# Patient Record
Sex: FEMALE | Race: WHITE | NOT HISPANIC OR LATINO
[De-identification: names, ages, dates, MRNs, and addresses within clinical notes are randomized per-mention and may not be internally consistent; named-entity substitution may affect disease eponyms.]

---

## 2017-03-12 ENCOUNTER — TRANSCRIPTION ENCOUNTER (OUTPATIENT)
Age: 22
End: 2017-03-12

## 2019-02-14 ENCOUNTER — TRANSCRIPTION ENCOUNTER (OUTPATIENT)
Age: 24
End: 2019-02-14

## 2019-08-20 PROBLEM — Z00.00 ENCOUNTER FOR PREVENTIVE HEALTH EXAMINATION: Status: ACTIVE | Noted: 2019-08-20

## 2019-09-30 ENCOUNTER — APPOINTMENT (OUTPATIENT)
Dept: OBGYN | Facility: CLINIC | Age: 24
End: 2019-09-30
Payer: COMMERCIAL

## 2019-09-30 VITALS
SYSTOLIC BLOOD PRESSURE: 120 MMHG | WEIGHT: 133.5 LBS | HEIGHT: 62 IN | BODY MASS INDEX: 24.56 KG/M2 | DIASTOLIC BLOOD PRESSURE: 80 MMHG

## 2019-09-30 DIAGNOSIS — Z78.9 OTHER SPECIFIED HEALTH STATUS: ICD-10-CM

## 2019-09-30 DIAGNOSIS — Z80.8 FAMILY HISTORY OF MALIGNANT NEOPLASM OF OTHER ORGANS OR SYSTEMS: ICD-10-CM

## 2019-09-30 PROCEDURE — 99204 OFFICE O/P NEW MOD 45 MIN: CPT

## 2019-09-30 NOTE — PHYSICAL EXAM
[Awake] : awake [Alert] : alert [Mass] : no breast mass [Acute Distress] : no acute distress [Nipple Discharge] : no nipple discharge [Soft] : soft [Axillary LAD] : no axillary lymphadenopathy [Tender] : non tender [Oriented x3] : oriented to person, place, and time [No Bleeding] : there was no active vaginal bleeding [Normal] : uterus [Uterine Adnexae] : were not tender and not enlarged

## 2019-10-02 LAB
C TRACH RRNA SPEC QL NAA+PROBE: NOT DETECTED
N GONORRHOEA RRNA SPEC QL NAA+PROBE: NOT DETECTED
SOURCE TP AMPLIFICATION: NORMAL

## 2019-10-09 LAB — CYTOLOGY CVX/VAG DOC THIN PREP: NORMAL

## 2020-01-31 ENCOUNTER — TRANSCRIPTION ENCOUNTER (OUTPATIENT)
Age: 25
End: 2020-01-31

## 2021-06-09 ENCOUNTER — APPOINTMENT (OUTPATIENT)
Dept: OBGYN | Facility: CLINIC | Age: 26
End: 2021-06-09
Payer: COMMERCIAL

## 2021-06-09 VITALS
BODY MASS INDEX: 24.03 KG/M2 | WEIGHT: 130.56 LBS | HEIGHT: 62 IN | SYSTOLIC BLOOD PRESSURE: 123 MMHG | HEART RATE: 80 BPM | DIASTOLIC BLOOD PRESSURE: 80 MMHG

## 2021-06-09 DIAGNOSIS — N94.6 DYSMENORRHEA, UNSPECIFIED: ICD-10-CM

## 2021-06-09 PROCEDURE — 99072 ADDL SUPL MATRL&STAF TM PHE: CPT

## 2021-06-09 PROCEDURE — 99395 PREV VISIT EST AGE 18-39: CPT

## 2021-06-11 LAB
C TRACH RRNA SPEC QL NAA+PROBE: NOT DETECTED
HPV HIGH+LOW RISK DNA PNL CVX: NOT DETECTED
N GONORRHOEA RRNA SPEC QL NAA+PROBE: NOT DETECTED
SOURCE TP AMPLIFICATION: NORMAL

## 2021-06-14 LAB — CYTOLOGY CVX/VAG DOC THIN PREP: NORMAL

## 2021-12-08 ENCOUNTER — APPOINTMENT (OUTPATIENT)
Dept: OBGYN | Facility: CLINIC | Age: 26
End: 2021-12-08
Payer: COMMERCIAL

## 2021-12-08 VITALS
HEIGHT: 62 IN | DIASTOLIC BLOOD PRESSURE: 82 MMHG | WEIGHT: 130.13 LBS | SYSTOLIC BLOOD PRESSURE: 118 MMHG | HEART RATE: 72 BPM | BODY MASS INDEX: 23.95 KG/M2

## 2021-12-08 DIAGNOSIS — Z30.9 ENCOUNTER FOR CONTRACEPTIVE MANAGEMENT, UNSPECIFIED: ICD-10-CM

## 2021-12-08 PROCEDURE — 99214 OFFICE O/P EST MOD 30 MIN: CPT

## 2021-12-08 NOTE — HISTORY OF PRESENT ILLNESS
[Oral Contraceptive] : uses oral contraception pills [Y] : Positive pregnancy history [Menarche Age: ____] : age at menarche was [unfilled] [PapSmeardate] : 06/21 [PGxTotal] : 1 [[facility]] : 1 [PGHxPremature] : 0 [PGHxAbortions] : 0 [[facility] Hospitaliving] : 1 [PGHxABInduced] : 0 [PGHxABSpont] : 0 [PGHxEctopic] : 0 [PGHxMultBirths] : 0

## 2022-05-12 ENCOUNTER — RX RENEWAL (OUTPATIENT)
Age: 27
End: 2022-05-12

## 2022-11-29 ENCOUNTER — NON-APPOINTMENT (OUTPATIENT)
Age: 27
End: 2022-11-29

## 2022-12-01 ENCOUNTER — APPOINTMENT (OUTPATIENT)
Dept: ORTHOPEDIC SURGERY | Facility: CLINIC | Age: 27
End: 2022-12-01

## 2022-12-01 VITALS
DIASTOLIC BLOOD PRESSURE: 84 MMHG | WEIGHT: 130 LBS | BODY MASS INDEX: 23.92 KG/M2 | HEIGHT: 62 IN | HEART RATE: 66 BPM | SYSTOLIC BLOOD PRESSURE: 124 MMHG

## 2022-12-01 DIAGNOSIS — M79.632 PAIN IN LEFT FOREARM: ICD-10-CM

## 2022-12-01 PROCEDURE — 99202 OFFICE O/P NEW SF 15 MIN: CPT

## 2022-12-01 NOTE — PHYSICAL EXAM
[de-identified] : The patient is conversive and in no apparent distress. The patient is alert and oriented to person, place, and time. Affect and mood appear normal. The head is normocephalic and atraumatic. Skin shows normal turgor with no evidence of eczema or psoriasis. No respiratory distress noted. Sensation grossly intact. MUSCULOSKELETAL:   SEE BELOW\par \par Left forearm, wrist and hand exam demonstrates normal alignment.  Soft tissues are normal.  No swelling.  No masses.  No surgical scars.  No signs of trauma or recent injury.  Normal temperature.  No bony tenderness of the joints of the left upper extremity.  There is tenderness of the mid left dorsal forearm between the ulnar and radius.  No tenderness of the ventral aspect is appreciated.  Normal passive range of motion of all bony joints.  This does not increase pain.  No rigidity.  No pain with resisted wrist extension.  Normal sensation to light touch.  Normal pulses. [de-identified] : Recent x-rays were reviewed.  No lytic lesions.  No fractures.  No retained hardware.  No soft tissue abnormalities appreciated.\par \par \par \par Procedure was performed at the [facility]\par \par EXAM: WRIST LEFT\par \par \par PROCEDURE DATE: 11/30/2022\par \par \par INTERPRETATION: XR WRIST LEFT\par \par HISTORY: Left wrist pain for 2 days with no known trauma.\par \par VIEWS: Frontal, lateral, oblique, and ulnar deviated frontal IMAGES: 4\par \par COMPARISON: None.\par \par FINDINGS:\par \par OSSEOUS STRUCTURES\par  Fractures: None.\par \par JOINTS\par  Joint Space(s): Maintained.\par \par IMPRESSION:\par No acute osseous abnormalities.\par \par --- End of Report ---\par \par \par \par \par \par LINSEY MARCELO MD; Resident Radiologist\par This document has been electronically signed.\par MINNIE NIÑO MD; Attending Radiologist\par This document has been electronically signed. Nov 30 2022 3:50PM\par \par \par \par    \par \par \par \par \par \par \par \par \par \par \par \par \par \par \par \par   \par  \par  \par \par \par Notes

## 2022-12-01 NOTE — DISCUSSION/SUMMARY
[de-identified] : 25 minutes was spent reviewing the x-rays as well as discussing with the patient their clinical presentation, diagnosis and providing education.  The recent x-rays were reviewed.  The patient's primary complaint is pain which she describes as a burning sensation of the left forearm.  It is not associate with any specific neck pain.  Normal motor function was found.  There is no complaints of motor dysfunction.  She does not recall the specific traumatic event causing her pain.  At this time the patient is recommended to continue to follow her symptoms.  If she is not improved in 2 weeks time she is recommended neurology or physiatry follow-up.  The primary complaint appears to be possibly related to nerve origin.  The patient will continue with activities as tolerated.  All questions answered to the patient satisfaction.

## 2022-12-01 NOTE — HISTORY OF PRESENT ILLNESS
[de-identified] : Patient presents today for evaluation of left forearm pain.  She is had the pain since Monday.  She reports the pain is within the mid left forearm.  It is both on the dorsal and ventral aspects.  She denies any specific injuries or trauma.  She denies of any associated motor changes.  She describes the pain as a burning sensation.  She does not have any pain when she moves the arm around.  The pain is not associated with any neck discomfort.  She is not regularly using any anti-inflammatories or analgesics for pain control.  She is not using any bracing or splinting.  She was seen in urgent care recently and referred to orthopedics for evaluation.  She is right-hand dominant.\par \par Review of Systems-\par Constitutional: No fever or chills. \par Cardiovascular: No orthopnea or chest pain\par Pulmonary: No shortness of breath. \par GI: No nausea or vomiting or abdominal pain.\par Musculoskeletal: see HPI \par Psychiatric: No anxiety and depression.

## 2023-02-01 ENCOUNTER — APPOINTMENT (OUTPATIENT)
Dept: OBGYN | Facility: CLINIC | Age: 28
End: 2023-02-01

## 2023-02-07 ENCOUNTER — LABORATORY RESULT (OUTPATIENT)
Age: 28
End: 2023-02-07

## 2023-02-07 ENCOUNTER — APPOINTMENT (OUTPATIENT)
Dept: OBGYN | Facility: CLINIC | Age: 28
End: 2023-02-07
Payer: COMMERCIAL

## 2023-02-07 VITALS
HEIGHT: 66 IN | DIASTOLIC BLOOD PRESSURE: 74 MMHG | BODY MASS INDEX: 21.53 KG/M2 | WEIGHT: 134 LBS | SYSTOLIC BLOOD PRESSURE: 108 MMHG

## 2023-02-07 DIAGNOSIS — Z01.411 ENCOUNTER FOR GYNECOLOGICAL EXAMINATION (GENERAL) (ROUTINE) WITH ABNORMAL FINDINGS: ICD-10-CM

## 2023-02-07 PROCEDURE — 99395 PREV VISIT EST AGE 18-39: CPT

## 2023-02-07 RX ORDER — NORETHINDRONE ACETATE AND ETHINYL ESTRADIOL AND FERROUS FUMARATE 1.5-30(21)
1.5-3 KIT ORAL DAILY
Qty: 84 | Refills: 3 | Status: DISCONTINUED | COMMUNITY
Start: 2021-06-09 | End: 2023-02-07

## 2023-02-07 NOTE — HISTORY OF PRESENT ILLNESS
[Patient reported PAP Smear was normal] : Patient reported PAP Smear was normal [N] : Patient does not use contraception [Y] : Patient is sexually active [Frequency: Q ___ days] : menstrual periods occur approximately every [unfilled] days [Menarche Age: ____] : age at menarche was [unfilled] [FreeTextEntry1] : 27 year old female presents for annual examination. Pt. is sexually active using nothing for contraception. Self discontinued OCP back in July or August. Pt, desires OCP today. No complaints of pelvic pain and vaginal discharge.  [PapSmeardate] : 2021 [PGxTotal] : 1 [[facility] CenterxF[facility]] : 1 [PGHxPremature] : 0 [PGHxAbortions] : 0 [[facility] Hospitaliving] : 1 [PGHxABInduced] : 0 [PGHxABSpont] : 0 [PGHxEctopic] : 0 [PGHxMultBirths] : 0

## 2023-02-07 NOTE — PLAN
[FreeTextEntry1] : Encounter for GYN exam \par \par - PAP obtained \par - STD testing offered and declined \par \par Encounter for counseling on contraception \par \par - Risks and benefits regarding contraception discussed. Pt. desires OCP. Pt. verbalized understanding to risks and benefits. \par \par F/U in 6 months \par All questions and concerns addressed during encounter. Pt. agreed to plan of care.

## 2023-02-09 LAB — HPV HIGH+LOW RISK DNA PNL CVX: DETECTED

## 2023-02-14 LAB — CYTOLOGY CVX/VAG DOC THIN PREP: ABNORMAL

## 2023-03-02 ENCOUNTER — APPOINTMENT (OUTPATIENT)
Dept: OBGYN | Facility: CLINIC | Age: 28
End: 2023-03-02
Payer: COMMERCIAL

## 2023-03-02 ENCOUNTER — RESULT CHARGE (OUTPATIENT)
Age: 28
End: 2023-03-02

## 2023-03-02 VITALS
WEIGHT: 135 LBS | HEIGHT: 66 IN | BODY MASS INDEX: 21.69 KG/M2 | DIASTOLIC BLOOD PRESSURE: 60 MMHG | SYSTOLIC BLOOD PRESSURE: 110 MMHG

## 2023-03-02 LAB
HCG UR QL: NEGATIVE
QUALITY CONTROL: YES

## 2023-03-02 PROCEDURE — 81025 URINE PREGNANCY TEST: CPT

## 2023-03-02 PROCEDURE — 57454 BX/CURETT OF CERVIX W/SCOPE: CPT

## 2023-03-02 NOTE — PROCEDURE
[Colposcopy] : Colposcopy  [Time out performed] : Pre-procedure time out performed.  Patient's name, date of birth and procedure confirmed. [Consent Obtained] : Consent obtained [Risks] : risks [Benefits] : benefits [Alternatives] : alternatives [Patient] : patient [Infection] : infection [Bleeding] : bleeding [Allergic Reaction] : allergic reaction [Abnormal Pap] : abnormal pap [Colposcopy Adequate] : colposcopy adequate [Pap Performed] : pap not performed [SCI Fully Visualized] : SCI fully visualized [ECC Performed] : ECC performed [No Abnormalities] : no abnormalities [Lesion] : lesion seen [Biopsy] : biopsy taken [Hemostasis Obtained] : Hemostasis obtained [Tolerated Well] : the patient tolerated the procedure well [de-identified] : 2 [de-identified] : Acetowhite changes [de-identified] : 9:00 and 12:00 location Punch biopsy [de-identified] : Punch biopsy [de-identified] : 28-year-old G1, P1 with ASCUS positive HPV for colposcopy.  Colposcopy was adequate with mild acetowhite changes at 12:00 location.  Cervical biopsies taken from 12 and 9:00 locations plus endocervical curetting.  Patient tolerated the procedure well.  Return in 4 weeks for follow-up.

## 2023-03-10 LAB — CORE LAB BIOPSY: NORMAL

## 2023-03-31 ENCOUNTER — APPOINTMENT (OUTPATIENT)
Dept: OBGYN | Facility: CLINIC | Age: 28
End: 2023-03-31
Payer: COMMERCIAL

## 2023-03-31 VITALS
HEIGHT: 66 IN | SYSTOLIC BLOOD PRESSURE: 118 MMHG | DIASTOLIC BLOOD PRESSURE: 70 MMHG | BODY MASS INDEX: 21.69 KG/M2 | WEIGHT: 135 LBS

## 2023-03-31 PROCEDURE — 99214 OFFICE O/P EST MOD 30 MIN: CPT

## 2023-09-22 ENCOUNTER — APPOINTMENT (OUTPATIENT)
Dept: OBGYN | Facility: CLINIC | Age: 28
End: 2023-09-22
Payer: COMMERCIAL

## 2023-09-22 ENCOUNTER — NON-APPOINTMENT (OUTPATIENT)
Age: 28
End: 2023-09-22

## 2023-09-22 VITALS
HEIGHT: 66 IN | WEIGHT: 135 LBS | SYSTOLIC BLOOD PRESSURE: 124 MMHG | BODY MASS INDEX: 21.69 KG/M2 | DIASTOLIC BLOOD PRESSURE: 82 MMHG

## 2023-09-22 DIAGNOSIS — Z30.09 ENCOUNTER FOR OTHER GENERAL COUNSELING AND ADVICE ON CONTRACEPTION: ICD-10-CM

## 2023-09-22 PROCEDURE — 99214 OFFICE O/P EST MOD 30 MIN: CPT

## 2023-09-25 LAB — HPV HIGH+LOW RISK DNA PNL CVX: NOT DETECTED

## 2023-09-29 LAB — CYTOLOGY CVX/VAG DOC THIN PREP: ABNORMAL

## 2023-10-27 ENCOUNTER — APPOINTMENT (OUTPATIENT)
Dept: OBGYN | Facility: CLINIC | Age: 28
End: 2023-10-27
Payer: COMMERCIAL

## 2023-10-27 VITALS
SYSTOLIC BLOOD PRESSURE: 132 MMHG | WEIGHT: 135 LBS | HEIGHT: 66 IN | BODY MASS INDEX: 21.69 KG/M2 | DIASTOLIC BLOOD PRESSURE: 85 MMHG

## 2023-10-27 DIAGNOSIS — R87.619 UNSPECIFIED ABNORMAL CYTOLOGICAL FINDINGS IN SPECIMENS FROM CERVIX UTERI: ICD-10-CM

## 2023-10-27 LAB
HCG UR QL: NEGATIVE
QUALITY CONTROL: YES

## 2023-10-27 PROCEDURE — 81025 URINE PREGNANCY TEST: CPT

## 2023-10-27 PROCEDURE — 57454 BX/CURETT OF CERVIX W/SCOPE: CPT

## 2023-11-03 LAB — CORE LAB BIOPSY: NORMAL

## 2023-11-27 ENCOUNTER — APPOINTMENT (OUTPATIENT)
Dept: OBGYN | Facility: CLINIC | Age: 28
End: 2023-11-27
Payer: COMMERCIAL

## 2023-11-27 VITALS
SYSTOLIC BLOOD PRESSURE: 110 MMHG | HEIGHT: 66 IN | DIASTOLIC BLOOD PRESSURE: 66 MMHG | BODY MASS INDEX: 21.69 KG/M2 | WEIGHT: 135 LBS

## 2023-11-27 DIAGNOSIS — Z30.09 ENCOUNTER FOR OTHER GENERAL COUNSELING AND ADVICE ON CONTRACEPTION: ICD-10-CM

## 2023-11-27 PROCEDURE — 99214 OFFICE O/P EST MOD 30 MIN: CPT

## 2023-11-27 RX ORDER — NORETHINDRONE ACETATE AND ETHINYL ESTRADIOL AND FERROUS FUMARATE 1.5-30(21)
1.5-3 KIT ORAL DAILY
Qty: 90 | Refills: 3 | Status: ACTIVE | COMMUNITY
Start: 2023-02-07 | End: 1900-01-01

## 2024-01-20 ENCOUNTER — NON-APPOINTMENT (OUTPATIENT)
Age: 29
End: 2024-01-20

## 2024-01-29 ENCOUNTER — NON-APPOINTMENT (OUTPATIENT)
Age: 29
End: 2024-01-29

## 2024-07-09 ENCOUNTER — NON-APPOINTMENT (OUTPATIENT)
Age: 29
End: 2024-07-09

## 2024-07-10 ENCOUNTER — APPOINTMENT (OUTPATIENT)
Dept: OBGYN | Facility: CLINIC | Age: 29
End: 2024-07-10
Payer: COMMERCIAL

## 2024-07-10 VITALS
BODY MASS INDEX: 22.11 KG/M2 | SYSTOLIC BLOOD PRESSURE: 120 MMHG | HEIGHT: 66 IN | DIASTOLIC BLOOD PRESSURE: 72 MMHG | WEIGHT: 137.6 LBS

## 2024-07-10 DIAGNOSIS — R87.619 UNSPECIFIED ABNORMAL CYTOLOGICAL FINDINGS IN SPECIMENS FROM CERVIX UTERI: ICD-10-CM

## 2024-07-10 DIAGNOSIS — Z30.09 ENCOUNTER FOR OTHER GENERAL COUNSELING AND ADVICE ON CONTRACEPTION: ICD-10-CM

## 2024-07-10 PROCEDURE — 99214 OFFICE O/P EST MOD 30 MIN: CPT

## 2024-07-11 LAB
C TRACH RRNA SPEC QL NAA+PROBE: NOT DETECTED
HPV HIGH+LOW RISK DNA PNL CVX: NOT DETECTED
N GONORRHOEA RRNA SPEC QL NAA+PROBE: NOT DETECTED

## 2024-07-15 LAB — CYTOLOGY CVX/VAG DOC THIN PREP: ABNORMAL

## 2024-07-20 ENCOUNTER — NON-APPOINTMENT (OUTPATIENT)
Age: 29
End: 2024-07-20

## 2024-11-09 ENCOUNTER — NON-APPOINTMENT (OUTPATIENT)
Age: 29
End: 2024-11-09

## 2024-11-11 ENCOUNTER — NON-APPOINTMENT (OUTPATIENT)
Age: 29
End: 2024-11-11

## 2024-11-23 ENCOUNTER — NON-APPOINTMENT (OUTPATIENT)
Age: 29
End: 2024-11-23

## 2025-01-31 ENCOUNTER — APPOINTMENT (OUTPATIENT)
Dept: OBGYN | Facility: CLINIC | Age: 30
End: 2025-01-31
Payer: COMMERCIAL

## 2025-01-31 VITALS
SYSTOLIC BLOOD PRESSURE: 110 MMHG | WEIGHT: 142.19 LBS | BODY MASS INDEX: 22.85 KG/M2 | HEIGHT: 66 IN | DIASTOLIC BLOOD PRESSURE: 72 MMHG

## 2025-01-31 DIAGNOSIS — N91.1 SECONDARY AMENORRHEA: ICD-10-CM

## 2025-01-31 DIAGNOSIS — R11.0 NAUSEA: ICD-10-CM

## 2025-01-31 DIAGNOSIS — Z87.898 PERSONAL HISTORY OF OTHER SPECIFIED CONDITIONS: ICD-10-CM

## 2025-01-31 PROCEDURE — 99214 OFFICE O/P EST MOD 30 MIN: CPT | Mod: 25

## 2025-01-31 PROCEDURE — 99395 PREV VISIT EST AGE 18-39: CPT

## 2025-01-31 PROCEDURE — 99459 PELVIC EXAMINATION: CPT

## 2025-01-31 RX ORDER — DOXYLAMINE SUCCINATE AND PYRIDOXINE HYDROCHLORIDE 10; 10 MG/1; MG/1
10-10 TABLET, DELAYED RELEASE ORAL
Qty: 30 | Refills: 3 | Status: ACTIVE | COMMUNITY
Start: 2025-01-31 | End: 1900-01-01

## 2025-01-31 RX ORDER — PNV NO.95/FERROUS FUM/FOLIC AC 28MG-0.8MG
28-0.8 TABLET ORAL DAILY
Qty: 90 | Refills: 3 | Status: ACTIVE | COMMUNITY
Start: 2025-01-31 | End: 1900-01-01

## 2025-02-03 LAB
C TRACH RRNA SPEC QL NAA+PROBE: NOT DETECTED
N GONORRHOEA RRNA SPEC QL NAA+PROBE: NOT DETECTED
SOURCE AMPLIFICATION: NORMAL

## 2025-02-11 DIAGNOSIS — Z34.91 ENCOUNTER FOR SUPERVISION OF NORMAL PREGNANCY, UNSPECIFIED, FIRST TRIMESTER: ICD-10-CM

## 2025-02-11 DIAGNOSIS — Z13.79 ENCOUNTER FOR OTHER SCREENING FOR GENETIC AND CHROMOSOMAL ANOMALIES: ICD-10-CM

## 2025-02-12 ENCOUNTER — ASOB RESULT (OUTPATIENT)
Age: 30
End: 2025-02-12

## 2025-02-12 ENCOUNTER — APPOINTMENT (OUTPATIENT)
Dept: ANTEPARTUM | Facility: CLINIC | Age: 30
End: 2025-02-12
Payer: COMMERCIAL

## 2025-02-12 PROCEDURE — 76801 OB US < 14 WKS SINGLE FETUS: CPT

## 2025-02-17 ENCOUNTER — LABORATORY RESULT (OUTPATIENT)
Age: 30
End: 2025-02-17

## 2025-02-18 ENCOUNTER — APPOINTMENT (OUTPATIENT)
Dept: OBGYN | Facility: CLINIC | Age: 30
End: 2025-02-18
Payer: COMMERCIAL

## 2025-02-18 VITALS
HEIGHT: 66 IN | DIASTOLIC BLOOD PRESSURE: 80 MMHG | BODY MASS INDEX: 22.7 KG/M2 | WEIGHT: 141.25 LBS | SYSTOLIC BLOOD PRESSURE: 122 MMHG

## 2025-02-18 DIAGNOSIS — Z3A.10 10 WEEKS GESTATION OF PREGNANCY: ICD-10-CM

## 2025-02-18 LAB
APPEARANCE: CLEAR
BILIRUBIN URINE: NEGATIVE
BLOOD URINE: NEGATIVE
COLOR: YELLOW
GLUCOSE QUALITATIVE U: NEGATIVE
KETONES URINE: NEGATIVE
LEUKOCYTE ESTERASE URINE: ABNORMAL
NITRITE URINE: NEGATIVE
PH URINE: 5.5
PROTEIN URINE: NEGATIVE
SPECIFIC GRAVITY URINE: >=1.03
UROBILINOGEN URINE: 0.2 (ref 0.2–?)

## 2025-02-18 PROCEDURE — 0500F INITIAL PRENATAL CARE VISIT: CPT

## 2025-02-19 LAB
ABORH: NORMAL
ALBUMIN SERPL ELPH-MCNC: 3.8 G/DL
ALP BLD-CCNC: 46 U/L
ALT SERPL-CCNC: 19 U/L
ANION GAP SERPL CALC-SCNC: 13 MMOL/L
ANTIBODY SCREEN: NORMAL
APPEARANCE: ABNORMAL
AST SERPL-CCNC: 19 U/L
BILIRUB SERPL-MCNC: <0.2 MG/DL
BILIRUBIN URINE: NEGATIVE
BLOOD URINE: NEGATIVE
BUN SERPL-MCNC: 14 MG/DL
CALCIUM SERPL-MCNC: 8.9 MG/DL
CHLORIDE SERPL-SCNC: 102 MMOL/L
CO2 SERPL-SCNC: 22 MMOL/L
COLOR: YELLOW
CREAT SERPL-MCNC: 0.66 MG/DL
EGFR: 120 ML/MIN/1.73M2
ESTIMATED AVERAGE GLUCOSE: 94 MG/DL
GLUCOSE QUALITATIVE U: NEGATIVE MG/DL
GLUCOSE SERPL-MCNC: 70 MG/DL
HBA1C MFR BLD HPLC: 4.9 %
HBV SURFACE AG SER QL: NONREACTIVE
HCT VFR BLD CALC: 37 %
HCV AB SER QL: NONREACTIVE
HCV S/CO RATIO: 0.14 S/CO
HGB A MFR BLD: 97.3 %
HGB A2 MFR BLD: 2.7 %
HGB BLD-MCNC: 12.6 G/DL
HGB FRACT BLD-IMP: NORMAL
HIV1+2 AB SPEC QL IA.RAPID: NONREACTIVE
KETONES URINE: NEGATIVE MG/DL
LEUKOCYTE ESTERASE URINE: ABNORMAL
MCHC RBC-ENTMCNC: 31.3 PG
MCHC RBC-ENTMCNC: 34.1 G/DL
MCV RBC AUTO: 92 FL
NITRITE URINE: NEGATIVE
PH URINE: 5.5
PLATELET # BLD AUTO: 188 K/UL
POTASSIUM SERPL-SCNC: 4.8 MMOL/L
PROT SERPL-MCNC: 6.1 G/DL
PROTEIN URINE: NEGATIVE MG/DL
RBC # BLD: 4.02 M/UL
RBC # FLD: 12 %
SODIUM SERPL-SCNC: 136 MMOL/L
SPECIFIC GRAVITY URINE: >1.03
T PALLIDUM AB SER QL IA: NEGATIVE
TSH SERPL-ACNC: 4.05 UIU/ML
UROBILINOGEN URINE: 0.2 MG/DL
WBC # FLD AUTO: 8.09 K/UL

## 2025-02-20 LAB
B19V IGG SER QL IA: 5.6 INDEX
B19V IGG+IGM SER-IMP: NORMAL
B19V IGG+IGM SER-IMP: POSITIVE
B19V IGM FLD-ACNC: 0.14 INDEX
B19V IGM SER-ACNC: NEGATIVE
LEAD BLD-MCNC: <1 UG/DL
MEV IGG FLD QL IA: >300 AU/ML
MEV IGG+IGM SER-IMP: POSITIVE
MUV AB SER-ACNC: POSITIVE
MUV IGG SER QL IA: 211 AU/ML
RUBV IGG FLD-ACNC: 3.69 INDEX
RUBV IGG SER-IMP: POSITIVE
VZV AB TITR SER: POSITIVE
VZV IGG SER IF-ACNC: 19.4 S/CO

## 2025-02-22 LAB
AR GENE MUT ANL BLD/T: NORMAL
FMR1 GENE MUT ANL BLD/T: NORMAL
M TB IFN-G BLD-IMP: NEGATIVE
QUANTIFERON TB PLUS MITOGEN MINUS NIL: >10 IU/ML
QUANTIFERON TB PLUS NIL: 0.02 IU/ML
QUANTIFERON TB PLUS TB1 MINUS NIL: 0 IU/ML
QUANTIFERON TB PLUS TB2 MINUS NIL: 0 IU/ML

## 2025-03-04 ENCOUNTER — APPOINTMENT (OUTPATIENT)
Dept: ANTEPARTUM | Facility: CLINIC | Age: 30
End: 2025-03-04
Payer: COMMERCIAL

## 2025-03-04 ENCOUNTER — ASOB RESULT (OUTPATIENT)
Age: 30
End: 2025-03-04

## 2025-03-04 ENCOUNTER — APPOINTMENT (OUTPATIENT)
Dept: OBGYN | Facility: CLINIC | Age: 30
End: 2025-03-04
Payer: COMMERCIAL

## 2025-03-04 ENCOUNTER — NON-APPOINTMENT (OUTPATIENT)
Age: 30
End: 2025-03-04

## 2025-03-04 VITALS
SYSTOLIC BLOOD PRESSURE: 112 MMHG | BODY MASS INDEX: 22.99 KG/M2 | HEIGHT: 66 IN | DIASTOLIC BLOOD PRESSURE: 62 MMHG | WEIGHT: 143.06 LBS

## 2025-03-04 DIAGNOSIS — Z3A.13 13 WEEKS GESTATION OF PREGNANCY: ICD-10-CM

## 2025-03-04 PROCEDURE — 36415 COLL VENOUS BLD VENIPUNCTURE: CPT

## 2025-03-04 PROCEDURE — 76813 OB US NUCHAL MEAS 1 GEST: CPT

## 2025-03-04 PROCEDURE — 0502F SUBSEQUENT PRENATAL CARE: CPT

## 2025-03-13 ENCOUNTER — ASOB RESULT (OUTPATIENT)
Age: 30
End: 2025-03-13

## 2025-03-13 ENCOUNTER — APPOINTMENT (OUTPATIENT)
Dept: MATERNAL FETAL MEDICINE | Facility: CLINIC | Age: 30
End: 2025-03-13
Payer: COMMERCIAL

## 2025-03-13 PROCEDURE — 99202 OFFICE O/P NEW SF 15 MIN: CPT | Mod: 95

## 2025-03-19 ENCOUNTER — NON-APPOINTMENT (OUTPATIENT)
Age: 30
End: 2025-03-19

## 2025-03-19 DIAGNOSIS — Z3A.13 13 WEEKS GESTATION OF PREGNANCY: ICD-10-CM

## 2025-03-19 DIAGNOSIS — Z34.92 ENCOUNTER FOR SUPERVISION OF NORMAL PREGNANCY, UNSPECIFIED, SECOND TRIMESTER: ICD-10-CM

## 2025-03-19 DIAGNOSIS — Z34.91 ENCOUNTER FOR SUPERVISION OF NORMAL PREGNANCY, UNSPECIFIED, FIRST TRIMESTER: ICD-10-CM

## 2025-03-19 DIAGNOSIS — Z3A.10 10 WEEKS GESTATION OF PREGNANCY: ICD-10-CM

## 2025-03-27 ENCOUNTER — NON-APPOINTMENT (OUTPATIENT)
Age: 30
End: 2025-03-27

## 2025-03-27 ENCOUNTER — APPOINTMENT (OUTPATIENT)
Dept: OBGYN | Facility: CLINIC | Age: 30
End: 2025-03-27
Payer: COMMERCIAL

## 2025-03-27 VITALS
HEIGHT: 66 IN | WEIGHT: 145 LBS | BODY MASS INDEX: 23.3 KG/M2 | DIASTOLIC BLOOD PRESSURE: 82 MMHG | SYSTOLIC BLOOD PRESSURE: 122 MMHG

## 2025-03-27 DIAGNOSIS — Z3A.15 15 WEEKS GESTATION OF PREGNANCY: ICD-10-CM

## 2025-03-27 PROCEDURE — 0502F SUBSEQUENT PRENATAL CARE: CPT

## 2025-03-28 LAB
AFP INTERP SERPL-IMP: NORMAL
AFP INTERP SERPL-IMP: NORMAL
AFP MOM CUT-OFF: 2.5
AFP MOM SERPL: 0.98
AFP PERCENTILE: 48
AFP SERPL-ACNC: 31.31 NG/ML
CARBAMAZEPINE?: NO
CURRENT SMOKER: NO
DIABETES STATUS PATIENT: NO
GA: NORMAL
GESTATIONAL AGE METHOD: NORMAL
HX OF NTD NARR: NO
MULTIPLE PREGNANCY: NORMAL
NEURAL TUBE DEFECT RISK FETUS: NORMAL
NEURAL TUBE DEFECT RISK POP: NORMAL
RECOM F/U: NO
TEST PERFORMANCE INFO SPEC: NORMAL
VALPROIC ACID?: NO

## 2025-04-17 ENCOUNTER — NON-APPOINTMENT (OUTPATIENT)
Age: 30
End: 2025-04-17

## 2025-04-29 ENCOUNTER — NON-APPOINTMENT (OUTPATIENT)
Age: 30
End: 2025-04-29

## 2025-04-29 ENCOUNTER — APPOINTMENT (OUTPATIENT)
Dept: OBGYN | Facility: CLINIC | Age: 30
End: 2025-04-29
Payer: COMMERCIAL

## 2025-04-29 ENCOUNTER — ASOB RESULT (OUTPATIENT)
Age: 30
End: 2025-04-29

## 2025-04-29 ENCOUNTER — APPOINTMENT (OUTPATIENT)
Dept: ANTEPARTUM | Facility: CLINIC | Age: 30
End: 2025-04-29
Payer: COMMERCIAL

## 2025-04-29 VITALS
HEIGHT: 66 IN | WEIGHT: 148.25 LBS | BODY MASS INDEX: 23.83 KG/M2 | SYSTOLIC BLOOD PRESSURE: 100 MMHG | DIASTOLIC BLOOD PRESSURE: 70 MMHG

## 2025-04-29 DIAGNOSIS — Z3A.20 20 WEEKS GESTATION OF PREGNANCY: ICD-10-CM

## 2025-04-29 LAB
APPEARANCE: CLEAR
BILIRUBIN URINE: NEGATIVE
BLOOD URINE: NEGATIVE
COLOR: YELLOW
GLUCOSE QUALITATIVE U: NEGATIVE
KETONES URINE: NEGATIVE
LEUKOCYTE ESTERASE URINE: NEGATIVE
NITRITE URINE: NEGATIVE
PH URINE: 5.5
PROTEIN URINE: NEGATIVE
SPECIFIC GRAVITY URINE: <=1.005
UROBILINOGEN URINE: 0.2 (ref 0.2–?)

## 2025-04-29 PROCEDURE — 76817 TRANSVAGINAL US OBSTETRIC: CPT

## 2025-04-29 PROCEDURE — 0502F SUBSEQUENT PRENATAL CARE: CPT

## 2025-04-29 PROCEDURE — 76811 OB US DETAILED SNGL FETUS: CPT
